# Patient Record
Sex: MALE | Race: BLACK OR AFRICAN AMERICAN | Employment: UNEMPLOYED | URBAN - METROPOLITAN AREA
[De-identification: names, ages, dates, MRNs, and addresses within clinical notes are randomized per-mention and may not be internally consistent; named-entity substitution may affect disease eponyms.]

---

## 2019-08-19 ENCOUNTER — HOSPITAL ENCOUNTER (INPATIENT)
Facility: CLINIC | Age: 34
LOS: 2 days | Discharge: PSYCHIATRIC OR SUBSTANCE USE FACILITY | End: 2019-08-21
Attending: EMERGENCY MEDICINE | Admitting: PSYCHIATRY & NEUROLOGY
Payer: COMMERCIAL

## 2019-08-19 DIAGNOSIS — F10.10 ALCOHOL ABUSE: ICD-10-CM

## 2019-08-19 PROBLEM — F19.20 CHEMICAL DEPENDENCY (H): Status: ACTIVE | Noted: 2019-08-19

## 2019-08-19 LAB
ALBUMIN SERPL-MCNC: 3.3 G/DL (ref 3.4–5)
ALCOHOL BREATH TEST: 0.1 (ref 0–0.01)
ALP SERPL-CCNC: 54 U/L (ref 40–150)
ALT SERPL W P-5'-P-CCNC: 30 U/L (ref 0–70)
AMPHETAMINES UR QL SCN: NEGATIVE
ANION GAP SERPL CALCULATED.3IONS-SCNC: 8 MMOL/L (ref 3–14)
AST SERPL W P-5'-P-CCNC: 26 U/L (ref 0–45)
BARBITURATES UR QL: NEGATIVE
BASOPHILS # BLD AUTO: 0 10E9/L (ref 0–0.2)
BASOPHILS NFR BLD AUTO: 0.1 %
BENZODIAZ UR QL: NEGATIVE
BILIRUB SERPL-MCNC: 0.5 MG/DL (ref 0.2–1.3)
BUN SERPL-MCNC: 12 MG/DL (ref 7–30)
CALCIUM SERPL-MCNC: 8.1 MG/DL (ref 8.5–10.1)
CANNABINOIDS UR QL SCN: POSITIVE
CHLORIDE SERPL-SCNC: 113 MMOL/L (ref 94–109)
CO2 SERPL-SCNC: 25 MMOL/L (ref 20–32)
COCAINE UR QL: NEGATIVE
CREAT SERPL-MCNC: 1.05 MG/DL (ref 0.66–1.25)
DIFFERENTIAL METHOD BLD: ABNORMAL
EOSINOPHIL # BLD AUTO: 0 10E9/L (ref 0–0.7)
EOSINOPHIL NFR BLD AUTO: 0.4 %
ERYTHROCYTE [DISTWIDTH] IN BLOOD BY AUTOMATED COUNT: 13.8 % (ref 10–15)
ETHANOL UR QL SCN: POSITIVE
GFR SERPL CREATININE-BSD FRML MDRD: >90 ML/MIN/{1.73_M2}
GLUCOSE SERPL-MCNC: 116 MG/DL (ref 70–99)
HCT VFR BLD AUTO: 38.5 % (ref 40–53)
HGB BLD-MCNC: 12.6 G/DL (ref 13.3–17.7)
IMM GRANULOCYTES # BLD: 0 10E9/L (ref 0–0.4)
IMM GRANULOCYTES NFR BLD: 0.1 %
LYMPHOCYTES # BLD AUTO: 3.8 10E9/L (ref 0.8–5.3)
LYMPHOCYTES NFR BLD AUTO: 54.7 %
MCH RBC QN AUTO: 32.7 PG (ref 26.5–33)
MCHC RBC AUTO-ENTMCNC: 32.7 G/DL (ref 31.5–36.5)
MCV RBC AUTO: 100 FL (ref 78–100)
MONOCYTES # BLD AUTO: 0.7 10E9/L (ref 0–1.3)
MONOCYTES NFR BLD AUTO: 9.6 %
NEUTROPHILS # BLD AUTO: 2.4 10E9/L (ref 1.6–8.3)
NEUTROPHILS NFR BLD AUTO: 35.1 %
NRBC # BLD AUTO: 0 10*3/UL
NRBC BLD AUTO-RTO: 0 /100
OPIATES UR QL SCN: NEGATIVE
PLATELET # BLD AUTO: 238 10E9/L (ref 150–450)
POTASSIUM SERPL-SCNC: 3.7 MMOL/L (ref 3.4–5.3)
PROT SERPL-MCNC: 6.6 G/DL (ref 6.8–8.8)
RBC # BLD AUTO: 3.85 10E12/L (ref 4.4–5.9)
SODIUM SERPL-SCNC: 146 MMOL/L (ref 133–144)
WBC # BLD AUTO: 6.9 10E9/L (ref 4–11)

## 2019-08-19 PROCEDURE — 99285 EMERGENCY DEPT VISIT HI MDM: CPT | Performed by: EMERGENCY MEDICINE

## 2019-08-19 PROCEDURE — 85025 COMPLETE CBC W/AUTO DIFF WBC: CPT | Performed by: EMERGENCY MEDICINE

## 2019-08-19 PROCEDURE — 82075 ASSAY OF BREATH ETHANOL: CPT | Performed by: EMERGENCY MEDICINE

## 2019-08-19 PROCEDURE — 80320 DRUG SCREEN QUANTALCOHOLS: CPT | Performed by: EMERGENCY MEDICINE

## 2019-08-19 PROCEDURE — 99284 EMERGENCY DEPT VISIT MOD MDM: CPT | Mod: Z6 | Performed by: EMERGENCY MEDICINE

## 2019-08-19 PROCEDURE — 80053 COMPREHEN METABOLIC PANEL: CPT | Performed by: EMERGENCY MEDICINE

## 2019-08-19 PROCEDURE — 12800008 ZZH R&B CD ADULT

## 2019-08-19 PROCEDURE — HZ2ZZZZ DETOXIFICATION SERVICES FOR SUBSTANCE ABUSE TREATMENT: ICD-10-PCS | Performed by: PSYCHIATRY & NEUROLOGY

## 2019-08-19 PROCEDURE — 80307 DRUG TEST PRSMV CHEM ANLYZR: CPT | Performed by: EMERGENCY MEDICINE

## 2019-08-19 RX ORDER — IBUPROFEN 600 MG/1
600 TABLET, FILM COATED ORAL EVERY 6 HOURS PRN
Status: DISCONTINUED | OUTPATIENT
Start: 2019-08-19 | End: 2019-08-21 | Stop reason: HOSPADM

## 2019-08-19 RX ORDER — MULTIPLE VITAMINS W/ MINERALS TAB 9MG-400MCG
1 TAB ORAL DAILY
Status: DISCONTINUED | OUTPATIENT
Start: 2019-08-19 | End: 2019-08-21 | Stop reason: HOSPADM

## 2019-08-19 RX ORDER — BISACODYL 10 MG
10 SUPPOSITORY, RECTAL RECTAL DAILY PRN
Status: DISCONTINUED | OUTPATIENT
Start: 2019-08-19 | End: 2019-08-21 | Stop reason: HOSPADM

## 2019-08-19 RX ORDER — LOPERAMIDE HCL 2 MG
2 CAPSULE ORAL 4 TIMES DAILY PRN
Status: DISCONTINUED | OUTPATIENT
Start: 2019-08-19 | End: 2019-08-21 | Stop reason: HOSPADM

## 2019-08-19 RX ORDER — TRAZODONE HYDROCHLORIDE 50 MG/1
50 TABLET, FILM COATED ORAL
Status: DISCONTINUED | OUTPATIENT
Start: 2019-08-19 | End: 2019-08-21 | Stop reason: HOSPADM

## 2019-08-19 RX ORDER — ALUMINA, MAGNESIA, AND SIMETHICONE 2400; 2400; 240 MG/30ML; MG/30ML; MG/30ML
30 SUSPENSION ORAL EVERY 4 HOURS PRN
Status: DISCONTINUED | OUTPATIENT
Start: 2019-08-19 | End: 2019-08-21 | Stop reason: HOSPADM

## 2019-08-19 RX ORDER — ATENOLOL 50 MG/1
50 TABLET ORAL DAILY PRN
Status: DISCONTINUED | OUTPATIENT
Start: 2019-08-19 | End: 2019-08-21 | Stop reason: HOSPADM

## 2019-08-19 RX ORDER — DIAZEPAM 5 MG
5-20 TABLET ORAL EVERY 30 MIN PRN
Status: DISCONTINUED | OUTPATIENT
Start: 2019-08-19 | End: 2019-08-21 | Stop reason: HOSPADM

## 2019-08-19 RX ORDER — FOLIC ACID 1 MG/1
1 TABLET ORAL DAILY
Status: DISCONTINUED | OUTPATIENT
Start: 2019-08-19 | End: 2019-08-21 | Stop reason: HOSPADM

## 2019-08-19 RX ORDER — ACETAMINOPHEN 325 MG/1
650 TABLET ORAL EVERY 4 HOURS PRN
Status: DISCONTINUED | OUTPATIENT
Start: 2019-08-19 | End: 2019-08-21 | Stop reason: HOSPADM

## 2019-08-19 RX ORDER — LANOLIN ALCOHOL/MO/W.PET/CERES
100 CREAM (GRAM) TOPICAL DAILY
Status: DISCONTINUED | OUTPATIENT
Start: 2019-08-19 | End: 2019-08-21 | Stop reason: HOSPADM

## 2019-08-19 RX ORDER — ONDANSETRON 4 MG/1
4 TABLET, ORALLY DISINTEGRATING ORAL EVERY 6 HOURS PRN
Status: DISCONTINUED | OUTPATIENT
Start: 2019-08-19 | End: 2019-08-21 | Stop reason: HOSPADM

## 2019-08-19 RX ORDER — NICOTINE 21 MG/24HR
1 PATCH, TRANSDERMAL 24 HOURS TRANSDERMAL DAILY
Status: DISCONTINUED | OUTPATIENT
Start: 2019-08-19 | End: 2019-08-21 | Stop reason: HOSPADM

## 2019-08-19 RX ORDER — HYDROXYZINE HYDROCHLORIDE 25 MG/1
25 TABLET, FILM COATED ORAL EVERY 4 HOURS PRN
Status: DISCONTINUED | OUTPATIENT
Start: 2019-08-19 | End: 2019-08-21 | Stop reason: HOSPADM

## 2019-08-19 ASSESSMENT — ACTIVITIES OF DAILY LIVING (ADL)
ORAL_HYGIENE: INDEPENDENT
DRESS: STREET CLOTHES;INDEPENDENT
LAUNDRY: WITH SUPERVISION
HYGIENE/GROOMING: INDEPENDENT

## 2019-08-19 ASSESSMENT — ENCOUNTER SYMPTOMS: AGITATION: 0

## 2019-08-19 NOTE — PROGRESS NOTES
08/19/19 1850   Patient Belongings   Did you bring any home meds/supplements to the hospital?  No   Patient Belongings other (see comments)   Belongings Search Yes   Clothing Search Yes   Second Staff Gaston   Comment See notes.   Storage bin: hat, shoes, belt.  Discharge bin: , wallet (w/[place] ID [employer] Paperwork), phone.   Security envelope ([id number]): Visa card, Snap card, Community cards (2).  A               Admission:  I am responsible for any personal items that are not sent to the safe or pharmacy.  [facility] is not responsible for loss, theft or damage of any property in my possession.    Signature:  _________________________________ Date: _______  Time: _____                                              Staff Signature:  ____________________________ Date: ________  Time: _____      2nd Staff person, if patient is unable/unwilling to sign:    Signature: ________________________________ Date: ________  Time: _____     Discharge:  [facility] has returned all of my personal belongings:    Signature: _________________________________ Date: ________  Time: _____                                          Staff Signature:  ____________________________ Date: ________  Time: _____

## 2019-08-19 NOTE — ED PROVIDER NOTES
[facility] EMERGENCY DEPARTMENT ([facility])     August 19, 2019    History     Chief Complaint   Patient presents with     Addiction Problem     seeking detox from ETOH and pcp. pt called ahead for bed.     HPI  Delvin Matos is a 33 year old male who presents the ED seeking detox from alcohol and PCP.  Patient states that he has been drinking 1/5 of vodka daily and smokes PCP daily.  He states his last smoked and drank a couple hours prior to arrival.  He states he has been in detox previously but cannot recall when.  He otherwise denies other mental health history, other medical problems, SI, HI, other substance use, or known withdrawal symptoms.    PAST MEDICAL HISTORY  No past medical history on file.  PAST SURGICAL HISTORY  No past surgical history on file.  FAMILY HISTORY  No family history on file.  SOCIAL HISTORY  Social History     Tobacco Use     Smoking status: Not on file   Substance Use Topics     Alcohol use: Not on file     MEDICATIONS  Current Facility-Administered Medications   Medication     acetaminophen (TYLENOL) tablet 650 mg     alum & mag hydroxide-simethicone (MYLANTA ES/MAALOX  ES) suspension 30 mL     atenolol (TENORMIN) tablet 50 mg     bisacodyl (DULCOLAX) Suppository 10 mg     diazepam (VALIUM) tablet 5-20 mg     folic acid (FOLVITE) tablet 1 mg     hydrOXYzine (ATARAX) tablet 25 mg     ibuprofen (ADVIL/MOTRIN) tablet 600 mg     loperamide (IMODIUM) capsule 2 mg     magnesium hydroxide (MILK OF MAGNESIA) suspension 30 mL     multivitamin w/minerals (THERA-VIT-M) tablet 1 tablet     nicotine (NICODERM CQ) 14 MG/24HR 24 hr patch 1 patch     nicotine Patch in Place     [START ON 8/20/2019] nicotine patch REMOVAL     ondansetron (ZOFRAN-ODT) ODT tab 4 mg     traZODone (DESYREL) tablet 50 mg     vitamin B1 (THIAMINE) tablet 100 mg     ALLERGIES  No Known Allergies      I have reviewed the Medications, Allergies, Past Medical and Surgical History, and Social History in the Epic  system.    Review of Systems   Psychiatric/Behavioral: Negative for agitation and suicidal ideas.   All other systems reviewed and are negative.      Physical Exam   BP: (!) 142/86  Pulse: 99  Heart Rate: 75  Temp: 98.9  F (37.2  C)  Resp: 14  Height: 182.9 cm (6')  Weight: 85.3 kg (188 lb)  SpO2: 98 %      Physical Exam   Constitutional: He is oriented to person, place, and time.   Comfortably resting, lying in bed, NAD, nondiaphoretic, lucid, fully conversant, no  respiratory distress, alert and oriented.  Mildly intoxicated   No signs of acute withdrawal   HENT:   Head: Normocephalic and atraumatic.   Eyes: Pupils are equal, round, and reactive to light. EOM are normal.   Neck: Normal range of motion. Neck supple.   Cardiovascular: Normal rate.   Pulmonary/Chest: Effort normal. No respiratory distress.   Abdominal: Soft. There is no tenderness.   Musculoskeletal:   No signs of trauma   Neurological: He is alert and oriented to person, place, and time.   Skin: Skin is warm and dry.       ED Course        Procedures   3:37 PM  The patient was seen and examined by Dr. Andrade in Room 7.                Critical Care time:  none     PAST MEDICAL HISTORY: No past medical history on file.    PAST SURGICAL HISTORY: No past surgical history on file.    FAMILY HISTORY: No family history on file.    SOCIAL HISTORY:   Social History     Tobacco Use     Smoking status: Not on file   Substance Use Topics     Alcohol use: Not on file           Results for orders placed or performed during the hospital encounter of 08/19/19   Drug abuse screen 6 urine (tox)   Result Value Ref Range    Amphetamine Qual Urine Negative NEG^Negative    Barbiturates Qual Urine Negative NEG^Negative    Benzodiazepine Qual Urine Negative NEG^Negative    Cannabinoids Qual Urine Positive (A) NEG^Negative    Cocaine Qual Urine Negative NEG^Negative    Ethanol Qual Urine Positive (A) NEG^Negative    Opiates Qualitative Urine Negative NEG^Negative   CBC with  platelets differential   Result Value Ref Range    WBC 6.9 4.0 - 11.0 10e9/L    RBC Count 3.85 (L) 4.4 - 5.9 10e12/L    Hemoglobin 12.6 (L) 13.3 - 17.7 g/dL    Hematocrit 38.5 (L) 40.0 - 53.0 %     78 - 100 fl    MCH 32.7 26.5 - 33.0 pg    MCHC 32.7 31.5 - 36.5 g/dL    RDW 13.8 10.0 - 15.0 %    Platelet Count 238 150 - 450 10e9/L    Diff Method Automated Method     % Neutrophils 35.1 %    % Lymphocytes 54.7 %    % Monocytes 9.6 %    % Eosinophils 0.4 %    % Basophils 0.1 %    % Immature Granulocytes 0.1 %    Nucleated RBCs 0 0 /100    Absolute Neutrophil 2.4 1.6 - 8.3 10e9/L    Absolute Lymphocytes 3.8 0.8 - 5.3 10e9/L    Absolute Monocytes 0.7 0.0 - 1.3 10e9/L    Absolute Eosinophils 0.0 0.0 - 0.7 10e9/L    Absolute Basophils 0.0 0.0 - 0.2 10e9/L    Abs Immature Granulocytes 0.0 0 - 0.4 10e9/L    Absolute Nucleated RBC 0.0    Comprehensive metabolic panel   Result Value Ref Range    Sodium 146 (H) 133 - 144 mmol/L    Potassium 3.7 3.4 - 5.3 mmol/L    Chloride 113 (H) 94 - 109 mmol/L    Carbon Dioxide 25 20 - 32 mmol/L    Anion Gap 8 3 - 14 mmol/L    Glucose 116 (H) 70 - 99 mg/dL    Urea Nitrogen 12 7 - 30 mg/dL    Creatinine 1.05 0.66 - 1.25 mg/dL    GFR Estimate >90 >60 mL/min/[1.73_m2]    GFR Estimate If Black >90 >60 mL/min/[1.73_m2]    Calcium 8.1 (L) 8.5 - 10.1 mg/dL    Bilirubin Total 0.5 0.2 - 1.3 mg/dL    Albumin 3.3 (L) 3.4 - 5.0 g/dL    Protein Total 6.6 (L) 6.8 - 8.8 g/dL    Alkaline Phosphatase 54 40 - 150 U/L    ALT 30 0 - 70 U/L    AST 26 0 - 45 U/L   Alcohol breath test POCT   Result Value Ref Range    Alcohol Breath Test 0.098 (A) 0.00 - 0.01            Labs Ordered and Resulted from Time of ED Arrival Up to the Time of Departure from the ED   DRUG ABUSE SCREEN 6 CHEM DEP URINE ([facility]) - Abnormal; Notable for the following components:       Result Value    Cannabinoids Qual Urine Positive (*)     Ethanol Qual Urine Positive (*)     All other components within normal limits   CBC WITH PLATELETS  "DIFFERENTIAL - Abnormal; Notable for the following components:    RBC Count 3.85 (*)     Hemoglobin 12.6 (*)     Hematocrit 38.5 (*)     All other components within normal limits   COMPREHENSIVE METABOLIC PANEL - Abnormal; Notable for the following components:    Sodium 146 (*)     Chloride 113 (*)     Glucose 116 (*)     Calcium 8.1 (*)     Albumin 3.3 (*)     Protein Total 6.6 (*)     All other components within normal limits   ALCOHOL BREATH TEST POCT - Abnormal; Notable for the following components:    Alcohol Breath Test 0.098 (*)     All other components within normal limits         I have reviewed the patient's prior chart including recent labs, ER visits, and available inpatient discharge summaries if available.      Assessments & Plan (with Medical Decision Making)   This is a 33-year-old male presenting to the emergency room with concerns for alcohol abuse as well as utilization of PCP.  Here in the emergency room he is vitally stable and his physical exam is unremarkable with no signs of trauma.  Clinical history was provided to intake and at this time he will be admitted to the detox service for continued care management.  On reassessment the patient continues to be stable without exhibiting withdrawal symptoms.    I have reviewed the nursing notes.    I have reviewed the findings, diagnosis, plan and need for follow up with the patient.    There are no discharge medications for this patient.      Final diagnoses:   Alcohol abuse     IGary, am serving as a trained medical scribe to document services personally performed by David Andrade MD, based on the provider's statements to me.      IDavid MD, was physically present and have reviewed and verified the accuracy of this note documented by Gary Melendez.   \"This dictation was performed with the assistance of voice recognition software and may contain inadvertant transcription  errors,  omissions and/or  inadvertent word " "substitution.\" --David Andrade MD       8/19/2019   [facility], [place], EMERGENCY DEPARTMENT     David Andrade MD  08/19/19 2419    "

## 2019-08-19 NOTE — ED NOTES
ED to Behavioral Floor Handoff    SITUATION  Delvin Matos is a 33 year old male who speaks  English and lives is homeless unknown The patient arrived in the ED by transportation van from [facility] with a complaint of Addiction Problem (seeking detox from ETOH and pcp. pt called ahead for bed.)  .The patient's current symptoms started/worsened 2 month(s) ago and during this time the symptoms have increased.   In the ED, pt was diagnosed with   Final diagnoses:   Alcohol abuse        Initial vitals were: BP: (!) 142/86  Pulse: 99  Temp: 98.9  F (37.2  C)  Resp: 14  Weight: 85.3 kg (188 lb)  SpO2: 98 %   --------  Is the patient diabetic? No   If yes, last blood glucose? --     If yes, was this treated in the ED? --  --------  Is the patient inebriated (ETOH) Yes or Impaired on other substances? No  MSSA done? No  Last MSSA score: --    Were withdrawal symptoms treated? No  Does the patient have a seizure history? No. If yes, date of most recent seizure--  --------  Is the patient patient experiencing suicidal ideation? denies current or recent suicidal ideation     Homicidal ideation? denies current or recent homicidal ideation or behaviors.    Self-injurious behavior/urges? denies current or recent self injurious behavior or ideation.  ------  Was pt aggressive in the ED No  Was a code called No  Is the pt now cooperative? Yes  -------  Meds given in ED: Medications - No data to display   Family present during ED course? No  Family currently present? No    BACKGROUND  Does the patient have a cognitive impairment or developmental disability? No  Allergies: No Known Allergies.   Social demographics are   Social History     Socioeconomic History     Marital status: Single     Spouse name: Not on file     Number of children: Not on file     Years of education: Not on file     Highest education level: Not on file   Occupational History     Not on file   Social Needs     Financial resource strain: Not on file     Food  insecurity:     Worry: Not on file     Inability: Not on file     Transportation needs:     Medical: Not on file     Non-medical: Not on file   Tobacco Use     Smoking status: Not on file   Substance and Sexual Activity     Alcohol use: Not on file     Drug use: Not on file     Sexual activity: Not on file   Lifestyle     Physical activity:     Days per week: Not on file     Minutes per session: Not on file     Stress: Not on file   Relationships     Social connections:     Talks on phone: Not on file     Gets together: Not on file     Attends Taoism service: Not on file     Active member of club or organization: Not on file     Attends meetings of clubs or organizations: Not on file     Relationship status: Not on file     Intimate partner violence:     Fear of current or ex partner: Not on file     Emotionally abused: Not on file     Physically abused: Not on file     Forced sexual activity: Not on file   Other Topics Concern     Not on file   Social History Narrative     Not on file        ASSESSMENT  Labs results   Labs Ordered and Resulted from Time of ED Arrival Up to the Time of Departure from the ED   CBC WITH PLATELETS DIFFERENTIAL - Abnormal; Notable for the following components:       Result Value    RBC Count 3.85 (*)     Hemoglobin 12.6 (*)     Hematocrit 38.5 (*)     All other components within normal limits   ALCOHOL BREATH TEST POCT - Abnormal; Notable for the following components:    Alcohol Breath Test 0.098 (*)     All other components within normal limits   COMPREHENSIVE METABOLIC PANEL   DRUG ABUSE SCREEN 6 CHEM DEP URINE ([facility])      Imaging Studies: No results found for this or any previous visit (from the past 24 hour(s)).   Most recent vital signs BP (!) 142/86   Pulse 99   Temp 98.9  F (37.2  C) (Oral)   Resp 14   Wt 85.3 kg (188 lb)   SpO2 98%    Abnormal labs/tests/findings requiring intervention:---   Pain control: pt had none  Nausea control: pt had none    RECOMMENDATION  Are  any infection precautions needed (MRSA, VRE, etc.)? No If yes, what infection? --  ---  Does the patient have mobility issues? independently. If yes, what device does the pt use? ---  ---  Is patient on 72 hour hold or commitment? No If on 72 hour hold, have hold and rights been given to patient? N/A  Are admitting orders written if after 10 p.m. ?N/A  Tasks needing to be completed:---     Cira Stringer   [facility]--     [phone] [place] [facility]   [phone] [facility]

## 2019-08-20 LAB
CHOLEST SERPL-MCNC: 156 MG/DL
GGT SERPL-CCNC: 107 U/L (ref 0–75)
HDLC SERPL-MCNC: 66 MG/DL
LDLC SERPL CALC-MCNC: 69 MG/DL
NONHDLC SERPL-MCNC: 90 MG/DL
T4 FREE SERPL-MCNC: 0.74 NG/DL (ref 0.76–1.46)
TRIGL SERPL-MCNC: 105 MG/DL
TSH SERPL DL<=0.005 MIU/L-ACNC: 0.25 MU/L (ref 0.4–4)
VIT B12 SERPL-MCNC: 438 PG/ML (ref 193–986)

## 2019-08-20 PROCEDURE — 25000132 ZZH RX MED GY IP 250 OP 250 PS 637: Performed by: PHYSICIAN ASSISTANT

## 2019-08-20 PROCEDURE — 82607 VITAMIN B-12: CPT | Performed by: CLINICAL NURSE SPECIALIST

## 2019-08-20 PROCEDURE — 99221 1ST HOSP IP/OBS SF/LOW 40: CPT | Mod: AI | Performed by: PSYCHIATRY & NEUROLOGY

## 2019-08-20 PROCEDURE — 84443 ASSAY THYROID STIM HORMONE: CPT | Performed by: CLINICAL NURSE SPECIALIST

## 2019-08-20 PROCEDURE — 99232 SBSQ HOSP IP/OBS MODERATE 35: CPT | Performed by: PHYSICIAN ASSISTANT

## 2019-08-20 PROCEDURE — 25000132 ZZH RX MED GY IP 250 OP 250 PS 637: Performed by: PSYCHIATRY & NEUROLOGY

## 2019-08-20 PROCEDURE — 99207 ZZC CDG-HISTORY COMP: MEETS EXP. PROBLEM FOCUSED - DOWN CODED LACK OF HPI: CPT | Performed by: PHYSICIAN ASSISTANT

## 2019-08-20 PROCEDURE — 12800008 ZZH R&B CD ADULT

## 2019-08-20 PROCEDURE — 36415 COLL VENOUS BLD VENIPUNCTURE: CPT | Performed by: CLINICAL NURSE SPECIALIST

## 2019-08-20 PROCEDURE — 82977 ASSAY OF GGT: CPT | Performed by: CLINICAL NURSE SPECIALIST

## 2019-08-20 PROCEDURE — 25000132 ZZH RX MED GY IP 250 OP 250 PS 637: Performed by: CLINICAL NURSE SPECIALIST

## 2019-08-20 PROCEDURE — 84439 ASSAY OF FREE THYROXINE: CPT | Performed by: CLINICAL NURSE SPECIALIST

## 2019-08-20 PROCEDURE — 80061 LIPID PANEL: CPT | Performed by: CLINICAL NURSE SPECIALIST

## 2019-08-20 RX ORDER — MULTIPLE VITAMINS W/ MINERALS TAB 9MG-400MCG
1 TAB ORAL DAILY
Qty: 30 TABLET | Refills: 0 | Status: SHIPPED | OUTPATIENT
Start: 2019-08-21

## 2019-08-20 RX ORDER — NICOTINE 21 MG/24HR
1 PATCH, TRANSDERMAL 24 HOURS TRANSDERMAL DAILY
Qty: 30 PATCH | Refills: 0 | Status: SHIPPED | OUTPATIENT
Start: 2019-08-21

## 2019-08-20 RX ORDER — AMLODIPINE BESYLATE 10 MG/1
10 TABLET ORAL DAILY
Status: DISCONTINUED | OUTPATIENT
Start: 2019-08-20 | End: 2019-08-21 | Stop reason: HOSPADM

## 2019-08-20 RX ORDER — CLONIDINE HYDROCHLORIDE 0.1 MG/1
0.1 TABLET ORAL 2 TIMES DAILY PRN
Status: DISCONTINUED | OUTPATIENT
Start: 2019-08-20 | End: 2019-08-21 | Stop reason: HOSPADM

## 2019-08-20 RX ORDER — AMLODIPINE BESYLATE 10 MG/1
10 TABLET ORAL DAILY
Qty: 30 TABLET | Refills: 0 | Status: SHIPPED | OUTPATIENT
Start: 2019-08-20

## 2019-08-20 RX ORDER — LISINOPRIL 10 MG/1
40 TABLET ORAL DAILY
Qty: 30 TABLET | Refills: 0 | Status: SHIPPED | OUTPATIENT
Start: 2019-08-20

## 2019-08-20 RX ORDER — LISINOPRIL 10 MG/1
40 TABLET ORAL DAILY
Status: ON HOLD | COMMUNITY
End: 2019-08-20

## 2019-08-20 RX ORDER — LISINOPRIL 20 MG/1
40 TABLET ORAL DAILY
Status: DISCONTINUED | OUTPATIENT
Start: 2019-08-20 | End: 2019-08-21 | Stop reason: HOSPADM

## 2019-08-20 RX ORDER — AMLODIPINE BESYLATE 10 MG/1
10 TABLET ORAL DAILY
Status: ON HOLD | COMMUNITY
End: 2019-08-20

## 2019-08-20 RX ADMIN — NICOTINE 1 PATCH: 14 PATCH TRANSDERMAL at 08:50

## 2019-08-20 RX ADMIN — CLONIDINE HYDROCHLORIDE 0.1 MG: 0.1 TABLET ORAL at 13:09

## 2019-08-20 RX ADMIN — IBUPROFEN 600 MG: 600 TABLET ORAL at 16:54

## 2019-08-20 RX ADMIN — LISINOPRIL 40 MG: 20 TABLET ORAL at 09:07

## 2019-08-20 RX ADMIN — AMLODIPINE BESYLATE 10 MG: 10 TABLET ORAL at 09:07

## 2019-08-20 RX ADMIN — MULTIPLE VITAMINS W/ MINERALS TAB 1 TABLET: TAB at 08:47

## 2019-08-20 RX ADMIN — FOLIC ACID 1 MG: 1 TABLET ORAL at 08:47

## 2019-08-20 RX ADMIN — IBUPROFEN 600 MG: 600 TABLET ORAL at 08:11

## 2019-08-20 RX ADMIN — THIAMINE HCL TAB 100 MG 100 MG: 100 TAB at 08:47

## 2019-08-20 ASSESSMENT — ACTIVITIES OF DAILY LIVING (ADL)
HYGIENE/GROOMING: INDEPENDENT
ORAL_HYGIENE: INDEPENDENT
DRESS: INDEPENDENT
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
## 2019-08-20 NOTE — H&P
I  TEAGAN BALTAZAR Was present with the fellow during hte service and documentation of the note, I have a verified the history and personally performed the physical exam and medical decision making, I agree with the assessment and plan or care as documented in the note.   I, teagan baltazar MD, have personally performed an examination of this patient and I have reviewed the resident's documentation.  I have edited the note to reflect all relevant changes.  I have discussed this patient with the house staff today   I agree with resident findings and plan in this resident H&P.  I have reviewed all vitals and laboratory findings.  ,TEAGAN BALTAZAR MD
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
## 2019-08-20 NOTE — CONSULTS
[facility]  Internal Medicine Consult     Delvin Matos MRN# [id number]   Age: 33 year old YOB: 1985     Date of Admission: 8/19/2019  Date of Consult:  8/20/2019    Requesting Service: Behavioral Health - Tabatha Kirby MD         Reason for Consult:   General medical evaluation         Assessment and Recommendations:   Delvin Matos is a 33 year old male with history of substance abuse and hypertension admitted to  seeking detoxification from alcohol.     1. Alcohol dependence and detoxification. Management per psychiatry team.     2. Hypertension. BP elevated since admission. He reports not taking medications leading up to admission.   - Restart Lisinopril 40 mg daily and Amlodipine 10 mg daily  - Continue to monitor blood pressure, please page Medicine if blood pressure remains >180/100    3. Mildly abnormal thyroid labs. TSH 0.25, FT4 mildly low at 0.74. Will repeat in 2 days.     Currently, medically stable and I will be happy to follow up and see again for any intercurrent medical issues. Thank you for the opportunity to be a part of this patient's care.         History of Present Illness:   Delvin Matos is admitted to behavioral health seeking detoxification from alcohol, also reporting PCP use. He reports drinking a fifth of vodka daily for many years. Denies history of withdrawal seizures or DTs. He has a history of hypertension, not consistently taking medications prior to admission.    He reports feeling okay. He reports he typically has symptoms of headaches and blurred vision when blood pressure is elevated, denies having these symptoms currently. He denies any dizziness or lightheaded symptoms. No chest pains. Denies dyspnea. Tolerating small amounts of oral intake. No constipation or diarrhea concerns. No LE edema.          Review of Systems:   A 10 point review of systems was performed and is negative unless otherwise noted in HPI.          Past Medical  History:   No past medical history on file.          Past Surgical History:    No past surgical history on file.          Family History:   Family history was reviewed.      No family history on file.          Social History:     Tobacco use: Smokes 1/2 ppd  Drug use: Denies use  Alcohol use: Denies use         Medications:     No current facility-administered medications on file prior to encounter.   No current outpatient medications on file prior to encounter.    Current Facility-Administered Medications   Medication     acetaminophen (TYLENOL) tablet 650 mg     alum & mag hydroxide-simethicone (MYLANTA ES/MAALOX  ES) suspension 30 mL     amLODIPine (NORVASC) tablet 10 mg     atenolol (TENORMIN) tablet 50 mg     bisacodyl (DULCOLAX) Suppository 10 mg     diazepam (VALIUM) tablet 5-20 mg     folic acid (FOLVITE) tablet 1 mg     hydrOXYzine (ATARAX) tablet 25 mg     ibuprofen (ADVIL/MOTRIN) tablet 600 mg     lisinopril (PRINIVIL/ZESTRIL) tablet 40 mg     loperamide (IMODIUM) capsule 2 mg     magnesium hydroxide (MILK OF MAGNESIA) suspension 30 mL     multivitamin w/minerals (THERA-VIT-M) tablet 1 tablet     nicotine (NICODERM CQ) 14 MG/24HR 24 hr patch 1 patch     nicotine Patch in Place     nicotine patch REMOVAL     ondansetron (ZOFRAN-ODT) ODT tab 4 mg     traZODone (DESYREL) tablet 50 mg     vitamin B1 (THIAMINE) tablet 100 mg            Allergies:   No Known Allergies          Physical Exam:   BP (!) 178/115   Pulse 53   Temp 97.3  F (36.3  C) (Oral)   Resp 16   Ht 1.829 m (6')   Wt 85.3 kg (188 lb)   SpO2 97%   BMI 25.50 kg/m     GENERAL: Alert and oriented x 3. NAD.   HEENT: Anicteric sclera. PERRL. Mucous membranes moist.   CV: RRR. S1, S2. No murmurs appreciated.   RESPIRATORY: Effort normal. Lungs CTAB with no wheezing, rales, rhonchi.   GI: Abdomen soft and non distended with normoactive bowel sounds present in all quadrants. No tenderness, rebound, guarding.   MUSCULOSKELETAL: No joint swelling or  tenderness.   NEUROLOGICAL: No focal deficits. Moves all extremities.   EXTREMITIES: No peripheral edema. Intact bilateral pedal pulses.   SKIN: No jaundice. No rashes.          Labs:   CBC:  Recent Labs   Lab Test 08/19/19  1620   WBC 6.9   RBC 3.85*   HGB 12.6*   HCT 38.5*      MCH 32.7   MCHC 32.7   RDW 13.8          CMP:  Recent Labs   Lab Test 08/19/19  1620   *   POTASSIUM 3.7   CHLORIDE 113*   SAVI 8.1*   CO2 25   BUN 12   CR 1.05   *   AST 26   ALT 30   BILITOTAL 0.5   ALBUMIN 3.3*   PROTTOTAL 6.6*   ALKPHOS 54       TSH:  TSH   Date Value Ref Range Status   08/20/2019 0.25 (L) 0.40 - 4.00 mU/L Final       Tox screen: +THC, +EtOH          Jessica Nation PA-C  Hospitalist Service  [phone]

## 2019-08-20 NOTE — PROGRESS NOTES
"Pt MSSA score is 1 and not endorsing withdrawal symptoms. Pt did not receive valium for > 24 hrs. Pt qualifies for status \"Out of detox\"   "

## 2019-08-20 NOTE — PLAN OF CARE
S:  Kartik is a 34 yo male who comes to [facility] seeking detox from alcohol. He reports that he has been drinking vodka, about eight shots daily for years.  He states that his last drink was very early today, (8/19/19) around 06:00 am.  He states that he also uses PCP, smokes it daily.   He states that his last use was today (8/19/19) but did not indicate what time.  He is a cigarette smoker, 1/2 ppd since age 14.    He denies any thoughts of self harm, suicide or thoughts of harming others.  He states that he is homeless and he has no one from whom he receives emotional support except for himself.  He states that reading the Bible gives him satisfaction.  He reports that he is a parent but does not care for any minor children.  He demonstrated minimal withdrawal symptoms: no sweats, no agitation and minimal tremors.  He ate a large meal and went straight to bed and fell fast asleep.  B:  He denies any medical problems except HTN. Epic lists anxiety and depression.  He has been to detox twice before and to treatment a couple of times as well.  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  A:  Pt with very mild alcohol withdrawal symptoms AEB MSSA scores of 5 & 3.    R:  Obtained admission orders.  Oriented to unit, schedule and POC.  Provided with a nutritious meal and encouraged increased fluid intake.  Counseled on smoking cessation.  Introduced to [facility].  Continue to monitor and medicate as ordered and indicated.

## 2019-08-20 NOTE — PLAN OF CARE
Behavioral Team Discussion: (8/20/2019)    Continued Stay Criteria/Rationale: Patient admitted for Chemical Use Issues.  Plan: The following services will be provided to the patient; psychiatric assessment, medication management, therapeutic milieu, individual and group support, and skills groups.   Participants: 3A Provider: Dr. Tabatha Kirby MD; 3A RN's: Grant RN; 3A CM's: Liliana Mays.  Summary/Recommendation: Providers will assess today for treatment recommendations, discharge planning, and aftercare plans. CM will meet with pt for discharge planning.   Medical/Physical: No medical concerns or conditions noted.  Precautions:   Behavioral Orders   Procedures     Code 1 - Restrict to Unit     Routine Programming     As clinically indicated     Status 15     Every 15 minutes.     Withdrawal precautions     Rationale for change in precautions or plan: N/A  Progress: Initial.

## 2019-08-20 NOTE — PROGRESS NOTES
Writer met with pt to discuss aftercare plans. Pt reports that he is interested in program at [facility], it is a [facility] based program that has a men's program. Pt reports he had  Rule 25 assessment 2 months prior at [facility]. Pt signed PJ's for both [facility] and [facility]. Writer placed call to [facility] ([phone]), left voicemail message requesting return call to discuss admission. Faxed copy of PJ to [facility] requesting copy of pt's most recent Rule 25 assessment. Pt will need update and funding approval through [facility] as pt has [facility] for insurance.  CM continues with Rule 25 update when original has been received.

## 2019-08-21 VITALS
DIASTOLIC BLOOD PRESSURE: 101 MMHG | WEIGHT: 188 LBS | HEIGHT: 72 IN | HEART RATE: 54 BPM | BODY MASS INDEX: 25.47 KG/M2 | SYSTOLIC BLOOD PRESSURE: 159 MMHG | TEMPERATURE: 97 F | OXYGEN SATURATION: 100 % | RESPIRATION RATE: 16 BRPM

## 2019-08-21 PROCEDURE — 25000132 ZZH RX MED GY IP 250 OP 250 PS 637: Performed by: PSYCHIATRY & NEUROLOGY

## 2019-08-21 PROCEDURE — 25000132 ZZH RX MED GY IP 250 OP 250 PS 637: Performed by: CLINICAL NURSE SPECIALIST

## 2019-08-21 RX ADMIN — IBUPROFEN 600 MG: 600 TABLET ORAL at 11:29

## 2019-08-21 RX ADMIN — LISINOPRIL 40 MG: 20 TABLET ORAL at 07:54

## 2019-08-21 RX ADMIN — THIAMINE HCL TAB 100 MG 100 MG: 100 TAB at 07:54

## 2019-08-21 RX ADMIN — FOLIC ACID 1 MG: 1 TABLET ORAL at 07:54

## 2019-08-21 RX ADMIN — MULTIPLE VITAMINS W/ MINERALS TAB 1 TABLET: TAB at 07:54

## 2019-08-21 RX ADMIN — AMLODIPINE BESYLATE 10 MG: 10 TABLET ORAL at 07:54

## 2019-08-21 NOTE — DISCHARGE INSTRUCTIONS
Behavioral Discharge Planning and Instructions  THANK YOU FOR CHOOSING THE [facility]  3A  [phone]    Summary: You were admitted to Station 3A on 8/19/19 for detoxification from alcohol.  A medical exam was performed that included lab work. You have met with a  and opted to follow-up with treatment at [facility].  Please take care and make your recovery a daily priority, Delvin! It was a pleasure working with you and the entire treatment team here wishes you the very best in your recovery!     Recommendation:  Residential Treatment    Main Diagnoses:  Per Dr. Tabatha Kirby MD:  Alcohol Use Disorder    Major Treatments, Procedures and Findings:  You were treated for alcohol detoxification using [facility] protocol. As an outpatient you will be prescribed ***, please take this medication as prescribed until it is gone.  You had labs drawn and those results were reviewed with you. Please take a copy of your lab work with you to your next primary care physician appointment.    Symptoms to Report:  If you experience more anxiety, confusion, sleeplessness, deep sadness or thoughts of suicide, notify your treatment team or notify your primary care physician. IF ANY OF THE SYMPTOMS YOU ARE EXPERIENCING ARE A MEDICAL EMERGENCY CALL 911 IMMEDIATELY.     Lifestyle Adjustment: Adjust your lifestyle to get enough sleep, relaxation, exercise and good nutrition. Continue to develop healthy coping skills to decrease stress and promote a sober living environment. Do not use mood altering substances including alcohol, illegal drugs or addictive medications other than what is currently prescribed.     Disposition: [facility]    Treatment Follow-Up:  [facility]  Admission Appointment: Wednesday August 21 at 3:00 pm  [address] [place]  [address]  [phone]    Resources:   AA/NA and Sponsors are excellent resources for support and you can find one at any  support group meeting.   Alcoholics Anonymous (www.alcoholics-anonymous.org): for local information 24 hours/day  AA Intergroup service office in [place] (http://www.aastpaul.org/) [phone]  AA Intergroup service office in [facility]: [phone]. (http://www.a[place].org/)  Narcotics Anonymous (www.Ex[employer] the World[employer]es3 [employer] Group.org) (250) 322-7496  https://aafairviewriverside.org/meetings  SMART Recovery - self management for addiction recovery:  www.smartrecBluedot Innovationy.org  Pathways ~ A Health Crisis Resource & Support Center:  200.987.7509.  https://prescribetoprevent.org/patient-education/videos/  http://www.harmreduction.org  [facility] 227-876-1351  Support Group:  AA/NA and Sponsor/support.  National [place] on Mental Illness (www.[place].garrett.org): 119.848.3313 or 246-307-8687.  Alcoholics Anonymous (www.alcoholics-anonymous.org): Check your phone book for your local chapter.  Suicide Awareness Voices of Education (SAVE) (www.save.org): 318-956-MTOE (3565)  National Suicide Prevention Line (www.mentalhealthmn.org): 899-970-EAJM (8111)  Mental Health Consumer/Survivor Network of [place] (www.mhcsn.net): 413.422.6101 or 164-877-4735  Mental Health Association of [place] (www.mentalhealth.org): 462.906.8827 or 632-781-5059   Substance Abuse and Mental Health Services (www.samhsa.gov)  [place] Opioid Prevention Coalition: www.opioidcoalition.org    [place] Recovery Connection (MRC)  [facility] connects people seeking recovery to resources that help foster and sustain long-term recovery.  Whether you are seeking resources for treatment, transportation, housing, job training, education, health care or other pathways to recovery, [facility] is a great place to start.  523.548.2685.  www.[facility].org    General Medication Instructions:   See your medication sheet(s) for instructions.   Take all medications as prescribed.  Make no changes unless your doctor suggests them.   Go to all your doctor visits.  Be sure to have  all your required lab tests. This way, your medicines can be refilled on time.  Do not use any forms of alcohol.    Please Note:  If you have any questions at anytime after you are discharged please call the [facility], [place] detox unit 3AW unit at [phone].  [facility], Behavioral Intake [phone]  Medical Records call [phone]  Outpatient Behavioral Intake call [phone]  LP+ Wait List/Bed Availability call [phone]    Please take this discharge folder with you to all your follow up appointments, it contains your lab results, diagnosis, medication list and discharge recommendations.      THANK YOU FOR CHOOSING THE [facility]

## 2019-08-21 NOTE — PROGRESS NOTES
Pt discharge to [facility]. Pt was given bus tokens and all his belongings. Pt denies SI/SIB and HI.

## 2021-05-18 ENCOUNTER — TRANSFERRED RECORDS (OUTPATIENT)
Dept: HEALTH INFORMATION MANAGEMENT | Facility: CLINIC | Age: 36
End: 2021-05-18

## 2021-08-16 ENCOUNTER — MEDICAL CORRESPONDENCE (OUTPATIENT)
Dept: HEALTH INFORMATION MANAGEMENT | Facility: CLINIC | Age: 36
End: 2021-08-16

## 2021-09-10 ENCOUNTER — TRANSCRIBE ORDERS (OUTPATIENT)
Dept: OTHER | Age: 36
End: 2021-09-10

## 2021-09-10 DIAGNOSIS — M79.2 NERVE PAIN: Primary | ICD-10-CM

## 2021-09-17 ENCOUNTER — TELEPHONE (OUTPATIENT)
Dept: ANESTHESIOLOGY | Facility: CLINIC | Age: 36
End: 2021-09-17

## 2021-09-17 NOTE — TELEPHONE ENCOUNTER
External referral received requesting comprehensive evaluation. Encounter routed to clinic coordinators for assistance with scheduling.     JES SheetsN, RN

## 2021-10-04 ENCOUNTER — TELEPHONE (OUTPATIENT)
Dept: PALLIATIVE MEDICINE | Facility: CLINIC | Age: 36
End: 2021-10-04

## 2021-10-04 NOTE — TELEPHONE ENCOUNTER
RN called the pt back to let him know that we are unable to prescribe Oxycodone without seeing him in clinic first. He was advised to check in with his PCP to see if they would be willing to bridge him until his appt on the 21st.   There are no sooner appts available prior to the 21st.   He verbalized understanding and hung up the phone.    Lorena Garza RN   Neurology Care Coordinator

## 2021-10-04 NOTE — TELEPHONE ENCOUNTER
[facility] Call Center    Phone Message    May a detailed message be left on voicemail: yes     Reason for Call: Medication Question or concern regarding medication   Prescription Clarification  Name of Medication: Oxycodone  Prescribing Provider: unsure      What on the order needs clarification? Pt requesting call back to discuss if Dr. Stratton would be willing to refill his oxycodone medication until his appt with him on 10/21?

## 2021-10-21 ENCOUNTER — OFFICE VISIT (OUTPATIENT)
Dept: PALLIATIVE MEDICINE | Facility: CLINIC | Age: 36
End: 2021-10-21
Attending: NURSE PRACTITIONER
Payer: COMMERCIAL

## 2021-10-21 VITALS
BODY MASS INDEX: 24.17 KG/M2 | SYSTOLIC BLOOD PRESSURE: 152 MMHG | WEIGHT: 178.2 LBS | DIASTOLIC BLOOD PRESSURE: 96 MMHG | HEART RATE: 89 BPM

## 2021-10-21 DIAGNOSIS — M79.2 NEUROPATHIC PAIN: Primary | ICD-10-CM

## 2021-10-21 PROCEDURE — 99203 OFFICE O/P NEW LOW 30 MIN: CPT

## 2021-10-21 RX ORDER — GABAPENTIN 300 MG/1
CAPSULE ORAL 3 TIMES DAILY
COMMUNITY

## 2021-10-21 RX ORDER — CYCLOBENZAPRINE HCL 10 MG
10 TABLET ORAL 3 TIMES DAILY PRN
Qty: 90 TABLET | Refills: 1 | Status: SHIPPED | OUTPATIENT
Start: 2021-10-21

## 2021-10-21 RX ORDER — GABAPENTIN 800 MG/1
800 TABLET ORAL 3 TIMES DAILY
Qty: 90 TABLET | Refills: 1 | Status: SHIPPED | OUTPATIENT
Start: 2021-10-21

## 2021-10-21 RX ORDER — NORTRIPTYLINE HCL 10 MG
10 CAPSULE ORAL AT BEDTIME
Qty: 90 CAPSULE | Refills: 1 | Status: SHIPPED | OUTPATIENT
Start: 2021-10-21

## 2021-10-21 ASSESSMENT — PATIENT HEALTH QUESTIONNAIRE - PHQ9: SUM OF ALL RESPONSES TO PHQ QUESTIONS 1-9: 9

## 2021-10-21 ASSESSMENT — PAIN SCALES - GENERAL: PAINLEVEL: WORST PAIN (10)
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
## 2022-04-06 ENCOUNTER — TRANSFERRED RECORDS (OUTPATIENT)
Dept: HEALTH INFORMATION MANAGEMENT | Facility: CLINIC | Age: 37
End: 2022-04-06
Payer: COMMERCIAL